# Patient Record
(demographics unavailable — no encounter records)

---

## 2024-10-18 NOTE — HISTORY OF PRESENT ILLNESS
[FreeTextEntry1] :  Bariatric surgical history: none comorbid conditions: hld  anti-obesity medication history: tirzepatide  adverse side effects: nausea and vomiting with dose escalations  Starting weight 5/24: 288 last weight 259 today 250    22 yo female with history of class 3 obesity, now class2, and hld presents for follow up on Zepbound 5mg (for about 2 months).  She feels that her weight loss has stalled. She's been going for more walks and going to the gym and lifting more frequently. She does reports some more stress at this time (her father has charcot foot).  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 She reports some non-scale victories (fitting into jeans, buying new suits).   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

## 2024-10-18 NOTE — ASSESSMENT
[FreeTextEntry1] : obesity class 2 requires medical weight loss therapy due to weight history and the positive effects weight loss can have on comorbid conditions of elevated LDL  -agrees to increase Zepbound to 7.5 mg for weight loss/dyslipidemia -encouraged to f/u 2-2.5 months or sooner if needed  -will repeat labs around the new year

## 2024-12-30 NOTE — HISTORY OF PRESENT ILLNESS
Caller: Ifrah Robertson    Relationship: Self    Best call back number: [phone]     Requested Prescriptions:   Requested Prescriptions     Pending Prescriptions Disp Refills   • Dulaglutide (Trulicity) 0.75 MG/0.5ML solution pen-injector 2 pen 0     Sig: Inject 0.75 mg under the skin into the appropriate area as directed Daily.        Pharmacy where request should be sent: [facility] [place] [address] [address] [place] [facility] [phone] [facility] [phone] FX     Additional details provided by patient: PATIENT USED LAST INJECTION ON 11/29/21    Does the patient have less than a 3 day supply:  [x] Yes  [] No    Saima CAMPBELL Rep   11/29/21 11:00 EST           
I was going to send in medication bc she is up[ to date on everything but her daily dose exceeds the daily limit the message states  
[FreeTextEntry1] : Bariatric surgical history: none comorbid conditions: hld  anti-obesity medication history: tirzepatide  adverse side effects:  Starting weight 5/24: 288 last weight 259 today 233 father has charcot foot  22 yo female with history of class 3 obesity  (sw 288) and hld presents for follow up.  She is on Zepbound, 7.5mg.  She reports really liking this dose and has no side effects.  She has been prioritizing protein and is working out more. She feels she needs to focus on drinking more water so she got a new water bottle to help with that goal.   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

## 2024-12-30 NOTE — ASSESSMENT
[FreeTextEntry1] : 22 yo female with class 3 obesity, BMI now 34, requires medical weight loss therapy due to weight history and the positive effects weight loss can have on comorbid conditions of elevated LDL:   -she would like to continue with Zepbound 7.5 mg for weight loss/dyslipidemia - she has been continuing to follow a reduced calorie diet which prioritizes protein and nutrition - she is a busy student but also continues to  aim for a minimum of 150 minutes moderate aerobic activity a week and resistance training 2x a week. -will repeat labs at next follow up

## 2025-02-19 NOTE — PHYSICAL EXAM
Surgery History and Physical    Date of service: 7/9/2019    Chief Complaint   Patient presents with   • Follow-up     diverticulitis     HISTORY OF PRESENT ILLNESS: Ana Nair is a 43 year old female who presents today at the request of self for abdominal pain. Ana is known to me from lap sigmoid colectomy 6/22/18.  Her post op course was complicated by early post op respiratory failure and ARDS.      She was in the ED 6/28/19 for abdominal and pelvic pain and CT was done showing contained perforation vs early abscess adjacent to the rectosigmoid junction at anastomosis.  TOn my review of the CT this may be the blind end of her end to side anastomosis however her story fits with recurrent diverticulitis. She had diverticulosis through the entire left colon and sporadically through her transverse colon at the time of surgery but only the suspected area of concern in her sigmoid was resected.      She is feeling better now.      Past Medical History:   Diagnosis Date   • Acute respiratory distress syndrome (ARDS) (CMS/HCC) 06/24/2018    Early post op respiratory failure with ARDS after surgery for diverticulitis   • Anxiety    • ASCUS with positive high risk HPV    • Chest pain    • Diverticulitis    • Hypertension    • Labial abscess     Recurring   • Migraine    • Right hip pain    • S/P colectomy 06/22/2018   • Sterilization 04/08/2013    Essure   • JERMAIN (stress urinary incontinence, female)     with sneezing and coughing    • Tobacco use     1/2PPD since age 18        Past Surgical History:   Procedure Laterality Date   • Abscess drainage  06/01/2011    Labia   • Abscess drainage  09/15/2011    Labia   • Colectomy  06/22/2018    Lap coverted to open sigmoid resection; Radha Rene MD   • Colonoscopy  02/24/2017    Repeat 2/2022 Dr. Khan   • Colonoscopy  02/24/2017    repeat due 2-2022   • Colonoscopy  11/02/2017    repeat 5 years   • Esophagogastroduodenoscopy  02/24/2017    Dr. Khan   •  Hysteroscpy tubal occlusion w/implnt      essure   • Insert intrauterine device  03/06/2009    Mirena removed   • Laparoscopic supracervical hysterectomy N/A 05/31/2017    Dr. Dean: Single Incision Laparoscopic Supracervical Hysterectomy with Right Salpingectomy    • Laparoscopy mobil splen flex  06/22/2018    Radha Rene MD   • Ovarian cyst removal Left 06/22/2018    Laparoscopic left ovarian cyst excision, drainage of simple left ovarian cyst   • Pap smear  1/17/2013   • Pelvic laparoscopy     • Salpingectomy Left 08/15/2013    Laparoscopic left salpingectomy   • Salpingectomy Right 05/31/2017    Dr. Dean   • Tubal ligation     • Upper gastrointestinal endoscopy  02/24/2017       CURRENT MEDICATIONS:   Current Outpatient Medications   Medication   • ciprofloxacin (CIPRO) 500 MG tablet   • metroNIDAZOLE (FLAGYL) 500 MG tablet   • lisinopril (ZESTRIL) 20 MG tablet   • amLODIPine (NORVASC) 10 MG tablet   • meloxicam (MOBIC) 15 MG tablet   • topiramate (TOPAMAX) 100 MG tablet   • topiramate (TOPAMAX) 50 MG tablet   • SUMAtriptan (IMITREX STATDOSE) 6 MG/0.5ML auto-injector   • FLUoxetine (PROZAC) 20 MG capsule   • montelukast (SINGULAIR) 10 MG tablet   • zolpidem (AMBIEN) 5 MG tablet   • ondansetron (ZOFRAN ODT) 4 MG disintegrating tablet   • rizatriptan (MAXALT) 5 MG tablet   • LORazepam (ATIVAN) 0.5 MG tablet   • Hydrocortisone 2 % Lotion   • fluticasone (FLONASE) 50 MCG/ACT nasal spray   • magnesium oxide (MAG-OX) 400 MG tablet   • albuterol 108 (90 BASE) MCG/ACT inhaler   • ZINC SULFATE PO   • meclizine HCl (ANTIVERT) 25 MG tablet   • metoCLOPramide (REGLAN) 5 MG tablet     No current facility-administered medications for this visit.        ALLERGIES:  ALLERGIES:   Allergen Reactions   • Propofol Other (See Comments)     ZAIDA (propofol infusion syndrome)- bradycardia and hypotension   • Seasonal Other (See Comments)   • Sulfa Antibiotics PRURITUS   • Adhesive   (Environmental) RASH and PRURITUS     Tape,  Bandaid    • Clindamycin RASH and PRURITUS   • Duricef [Cefadroxil] RASH     Full body rash/itching   • Morphine Sulfate PRURITUS and Other (See Comments)     FLUSHING        SOCIAL HISTORY:   Social History     Tobacco Use   • Smoking status: Current Every Day Smoker     Packs/day: 0.50     Years: 23.00     Pack years: 11.50     Types: Cigarettes     Start date: 6/16/1994   • Smokeless tobacco: Never Used   Substance Use Topics   • Alcohol use: No     Comment: NONE        FAMILY HISTORY:   Family History   Problem Relation Age of Onset   • Heart disease Father    • Myocardial Infarction Father    • Stroke Father    • Cancer Paternal Grandfather         Unsure of type   • Cancer Paternal Grandmother         Rectal cancer   • Diabetes Maternal Aunt        REVIEW OF SYSTEMS:   Constitutional:  Denies fever, chills  Respiratory:  Denies cough or shortness of breath   Cardiovascular:  Denies chest pain or edema   GI:  Per HPI  :  Denies dysuria or hematuria   Integument:  Denies rash   Neurologic:  Denies headache, focal weakness or sensory changes    Psychiatric:  Endorses anxiety      PHYSICAL EXAM:  Visit Vitals  BP (!) 148/92 (BP Location: [facility] [place], Patient Position: Sitting, Cuff Size: Regular)   Pulse 74   Temp 96.9 °F (36.1 °C) (Temporal)   Resp 24   Ht 5' 4\" (1.626 m)   Wt 100.4 kg   LMP 04/26/2017   BMI 38.00 kg/m²       Constitutional: Alert, oriented, no acute distress  Respiratory: Good respiratory effort  Cardiovascular: heart is regular rate and rhythm, posterior tibial pulses intact, no extremity edema  GI: abdomen is soft, non distended, fascial weakness lower midline appreciable  Musculoskeletal: normal range of motion, extremities normal in appearance  Neuro: grossly normal motor and sensory   Psych: normal affect, appropriate decision making  Skin: intact without jaundice or discoloration, no erythema, no surgical scars    IMAGING: CT reviewed    LABS: pertinent labs reviewed    IMPRESSION:  Recurrent  [Obese] : obese [Normal] : affect appropriate diverticulitis status post sigmoid colectomy  Attenuated fascia in the midline without discrete fascial defect    PLAN:   We reviewed her CT.  She is recovered currently.  Based on her symptoms I do believe she had recurrent diverticulitis.  She has repeat CT tomorrow.  We discussed options for management.  I have recommended against surgery for now given how complex her post op course was last time she had surgery.  If this is an ongoing issue she will require left hemicolectomy.      Given early post op ARDS last surgery I recommend any surgical intervention be done at a facility with an ICU.  If elective surgery is planned will ask her to see pulmonology pre procedure.     We discussed her weight and she is interested in meeting with dietician for help in high fiber diet choices but also for help with weight.  We discussed that weight loss will help reduce the risk of any planned or emergent surgery.      OK to return to work without restrictions.    Radha Rene MD 7/9/2019

## 2025-02-20 NOTE — ASSESSMENT
[FreeTextEntry1] : 24 yo female with class 3 obesity,  requires medical weight loss therapy due to weight history and the positive effects weight loss can have on comorbid conditions of elevated LDL:   - Obesity: improving, She agrees to go up to 10mg Zepbound  - she has been continuing to follow a reduced calorie diet which prioritizes protein and nutrition - she is a busy student but also continues to  aim for a minimum of 150 minutes moderate aerobic activity a week and resistance training 2x a week. -will repeat labs  in May  - Her PA is expiring, her starting weight was 288 and she is now 220, she's lost 23.6% of her starting weight. It is recommended that she continue on Zepbound therapy.

## 2025-02-20 NOTE — ASSESSMENT
[FreeTextEntry1] : 22 yo female with class 3 obesity,  requires medical weight loss therapy due to weight history and the positive effects weight loss can have on comorbid conditions of elevated LDL:   - Obesity: improving, She agrees to go up to 10mg Zepbound  - she has been continuing to follow a reduced calorie diet which prioritizes protein and nutrition - she is a busy student but also continues to  aim for a minimum of 150 minutes moderate aerobic activity a week and resistance training 2x a week. -will repeat labs  in May  - Her PA is expiring, her starting weight was 288 and she is now 220, she's lost 23.6% of her starting weight. It is recommended that she continue on Zepbound therapy.

## 2025-02-20 NOTE — ASSESSMENT
Please let patient know that this result cannot be compared to previous exam due to utilizing different machines. It does however appear to have worsened with an increased risk for hip fracture. Medication would be recommended at this time. If she is interested, have her schedule an appointment to discuss.     Please give patient other note results also.  Left message for patient to call office back      [FreeTextEntry1] : 22 yo female with class 3 obesity,  requires medical weight loss therapy due to weight history and the positive effects weight loss can have on comorbid conditions of elevated LDL:   - Obesity: improving, She agrees to go up to 10mg Zepbound  - she has been continuing to follow a reduced calorie diet which prioritizes protein and nutrition - she is a busy student but also continues to  aim for a minimum of 150 minutes moderate aerobic activity a week and resistance training 2x a week. -will repeat labs  in May  - Her PA is expiring, her starting weight was 288 and she is now 220, she's lost 23.6% of her starting weight. It is recommended that she continue on Zepbound therapy.

## 2025-02-20 NOTE — HISTORY OF PRESENT ILLNESS
[FreeTextEntry1] : Bariatric surgical history: none comorbid conditions: hld  anti-obesity medication history: tirzepatide  adverse side effects:  Starting weight 5/24: 288  24 yo female with history of class 3 obesity  (sw 288) and hld presents for follow up.  She is on Zepbound, 7.5mg. She is still losing weight but she feels more food noise and guilt coming back.  She has no side effects but notes she cannot have salad, it makes her throw up and have GI symptoms either diarrhea or constipation.   She also notes her menstrual cycles have been more regular on the medicine, closer to 30 days.   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

## 2025-02-20 NOTE — HISTORY OF PRESENT ILLNESS
[FreeTextEntry1] : Bariatric surgical history: none comorbid conditions: hld  anti-obesity medication history: tirzepatide  adverse side effects:  Starting weight 5/24: 288  22 yo female with history of class 3 obesity  (sw 288) and hld presents for follow up.  She is on Zepbound, 7.5mg. She is still losing weight but she feels more food noise and guilt coming back.  She has no side effects but notes she cannot have salad, it makes her throw up and have GI symptoms either diarrhea or constipation.   She also notes her menstrual cycles have been more regular on the medicine, closer to 30 days.   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

## 2025-04-24 NOTE — HISTORY OF PRESENT ILLNESS
[FreeTextEntry1] : Bariatric surgical history: none comorbid conditions: hld  anti-obesity medication history: tirzepatide  adverse side effects:  Starting weight 5/24: 288 goal:   25 yo female with history of class 3 obesity  (sw 288) and hld presents for follow up.  She is on Zepbound 10mg. She is still losing weight and feels well. No side effects on this dose.  Right now she goes to they gym and goes for walk daily. She might start running again.    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

## 2025-04-24 NOTE — ASSESSMENT
[FreeTextEntry1] : 25 yo female with class 3 obesity,  requires medical weight loss therapy due to weight history and the positive effects weight loss can have on comorbid conditions of elevated LDL:   - Obesity: improving,  - she agrees to  cw Zepbound 10mg  - she has been continuing to follow a reduced calorie diet which prioritizes protein and nutrition - she is a busy student but also continues to  aim for a minimum of 150 minutes moderate aerobic activity a week and resistance training 2x a week. -will repeat labs now - follow up 10 weeks